# Patient Record
Sex: MALE | Race: OTHER | HISPANIC OR LATINO | URBAN - METROPOLITAN AREA
[De-identification: names, ages, dates, MRNs, and addresses within clinical notes are randomized per-mention and may not be internally consistent; named-entity substitution may affect disease eponyms.]

---

## 2023-05-09 ENCOUNTER — EMERGENCY (EMERGENCY)
Facility: HOSPITAL | Age: 38
LOS: 0 days | Discharge: HOME | End: 2023-05-09
Attending: EMERGENCY MEDICINE
Payer: SELF-PAY

## 2023-05-09 VITALS
DIASTOLIC BLOOD PRESSURE: 78 MMHG | HEART RATE: 60 BPM | SYSTOLIC BLOOD PRESSURE: 126 MMHG | HEIGHT: 65 IN | TEMPERATURE: 98 F | RESPIRATION RATE: 18 BRPM | OXYGEN SATURATION: 97 % | WEIGHT: 164.91 LBS
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
## 2023-05-09 DIAGNOSIS — S02.2XXA FRACTURE OF NASAL BONES, INITIAL ENCOUNTER FOR CLOSED FRACTURE: ICD-10-CM

## 2023-05-09 DIAGNOSIS — R22.0 LOCALIZED SWELLING, MASS AND LUMP, HEAD: ICD-10-CM
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
## 2023-05-09 DIAGNOSIS — R04.0 EPISTAXIS: ICD-10-CM

## 2023-05-09 PROCEDURE — 70486 CT MAXILLOFACIAL W/O DYE: CPT | Mod: MA

## 2023-05-09 PROCEDURE — 70450 CT HEAD/BRAIN W/O DYE: CPT | Mod: MA

## 2023-05-09 PROCEDURE — 70450 CT HEAD/BRAIN W/O DYE: CPT | Mod: 26,MA

## 2023-05-09 PROCEDURE — 70486 CT MAXILLOFACIAL W/O DYE: CPT | Mod: 26,MA

## 2023-05-09 PROCEDURE — 21315 CLSD TX NSL FX MNPJ WO STBLJ: CPT

## 2023-05-09 PROCEDURE — 99284 EMERGENCY DEPT VISIT MOD MDM: CPT

## 2023-05-09 PROCEDURE — 99285 EMERGENCY DEPT VISIT HI MDM: CPT | Mod: 25

## 2023-05-09 RX ORDER — IBUPROFEN 200 MG
600 TABLET ORAL ONCE
Refills: 0 | Status: COMPLETED | OUTPATIENT
Start: 2023-05-09 | End: 2023-05-09

## 2023-05-09 RX ADMIN — Medication 600 MILLIGRAM(S): at 22:09

## 2023-05-09 NOTE — ED PROVIDER NOTE - NSFOLLOWUPINSTRUCTIONS_ED_ALL_ED_FT
.Nasal Fracture    A nasal fracture is a break or crack in the bones or cartilage of the nose. Minor breaks do not require treatment. These breaks usually heal on their own after about one month. Serious breaks may require surgery.     CAUSES  This injury is usually caused by a blunt injury to the nose. This type of injury often occurs from:    Contact sports.  Car accidents.  Falls.  Getting punched.    SYMPTOMS  Symptoms of this injury include:    Pain.  Swelling of the nose.  Bleeding from the nose.  Bruising around the nose or eyes. This may include having black eyes.  Crooked appearance of the nose.    DIAGNOSIS  This injury may be diagnosed with a physical exam. The health care provider will gently feel the nose for signs of broken bones. He or she will look inside the nostrils to make sure that there is not a blood-filled swelling on the dividing wall between the nostrils (septal hematoma). X-rays of the nose may not show a nasal fracture even when one is present. In some cases, X-rays or a CT scan may be done 1–5 days after the injury. Sometimes, the health care provider will want to wait until the swelling has gone down.    TREATMENT  Often, minor fractures that have caused no deformity do not require treatment. More serious fractures in which bones have moved out of position may require surgery, which will take place after the swelling is gone. Surgery will stabilize and align the fracture. In some cases, a health care provider may be able to reposition the bones without surgery. This may be done in the health care provider's office after medicine is given to numb the area (local anesthetic).    HOME CARE INSTRUCTIONS  If directed, apply ice to the injured area:  Put ice in a plastic bag.  Place a towel between your skin and the bag.  Leave the ice on for 20 minutes, 2–3 times per day.  Take over-the-counter and prescription medicines only as told by your health care provider.  If your nose starts to bleed, sit in an upright position while you squeeze the soft parts of your nose against the dividing wall between your nostrils (septum) for 10 minutes.  Try to avoid blowing your nose.  Return to your normal activities as told by your health care provider. Ask your health care provider what activities are safe for you.  Avoid contact sports for 3–4 weeks or as told by your health care provider.  Keep all follow-up visits as told by your health care provider. This is important.    SEEK MEDICAL CARE IF:  Your pain increases or becomes severe.  You continue to have nosebleeds.  The shape of your nose does not return to normal within 5 days.  You have pus draining out of your nose.    SEEK IMMEDIATE MEDICAL CARE IF:  You have bleeding from your nose that does not stop after you pinch your nostrils closed for 20 minutes and keep ice on your nose.  You have clear fluid draining out of your nose.  You notice a grape-like swelling on the septum. This swelling is a collection of blood (hematoma) that must be drained to help prevent infection.  You have difficulty moving your eyes.  You have repeated vomiting.    ADDITIONAL NOTES AND INSTRUCTIONS    Please follow up with your Primary MD in 24-48 hr.  Seek immediate medical care for any new/worsening signs or symptoms.

## 2023-05-09 NOTE — ED PROVIDER NOTE - CARE PROVIDER_API CALL
Armando Gavin)  Otolaryngology  [address], [address] [place] [address]  Phone: [phone]  Fax: [phone]  Follow Up Time:     Bee Luong)  Plastic Surgery  [address], [address]  [place] [address]  Phone: [phone]  Fax: [phone]  Follow Up Time:

## 2023-05-09 NOTE — ED PROVIDER NOTE - CARE PROVIDERS DIRECT ADDRESSES
[email or web address]

## 2023-05-09 NOTE — ED PROVIDER NOTE - NS ED ATTENDING STATEMENT MOD
This was a shared visit with the [facility]. I reviewed and verified the documentation and independently performed the documented:

## 2023-05-09 NOTE — ED PROVIDER NOTE - PATIENT PORTAL LINK FT
You can access the FollowMyHealth Patient Portal offered by [facility] by registering at the following website: [email or web address]. By joining [facility]’s FollowMyHealth portal, you will also be able to view your health information using other applications (apps) compatible with our system.

## 2023-05-09 NOTE — ED PROVIDER NOTE - ATTENDING APP SHARED VISIT CONTRIBUTION OF CARE
37-year-old man complaining of nasal pain and deformity with left-sided facial swelling after he was hit in the face during martial arts practice.  Some pain with left eye movement, but no visual disturbance, headache, nausea, vomiting, neck pain or other focal deficits.  Vital signs, exam as noted.  Nasal bone fracture was reduced at bedside.  Will image to assess for orbital fracture, reassess.

## 2023-05-09 NOTE — ED ADULT NURSE NOTE - OBJECTIVE STATEMENT
Pt. c/o headache and nose pain. Pt. states that during martial arts, someone fell on his face. Pt. denies LOC.

## 2023-05-09 NOTE — ED PROVIDER NOTE - OBJECTIVE STATEMENT
36 yo male no sig hx present c/o nose pain/deformity, nosebleed and left sided facial bone and swelling s/p a person fell on top of his face during martial art practice. pain to left side of face worsen with left eye movement. otherwise denies HA/LOC/nausea and vomiting. denies neck/back pain/ chest and abd pain.

## 2023-05-09 NOTE — ED ADULT NURSE NOTE - NSIMPLEMENTINTERV_GEN_ALL_ED
Implemented All Universal Safety Interventions:  [place] to call system. Call bell, personal items and telephone within reach. Instruct patient to call for assistance. Room bathroom lighting operational. Non-slip footwear when patient is off stretcher. Physically safe environment: no spills, clutter or unnecessary equipment. Stretcher in lowest position, wheels locked, appropriate side rails in place.

## 2023-05-09 NOTE — ED PROVIDER NOTE - CLINICAL SUMMARY MEDICAL DECISION MAKING FREE TEXT BOX
CT max face with nasal bone fracture only, no injury to the orbit.  Results were discussed with patient and he was discharged to follow-up PMD/ENT/plastics.

## 2023-05-09 NOTE — ED PROVIDER NOTE - PHYSICAL EXAMINATION
CONSTITUTIONAL: Well-appearing; well-nourished; in no apparent distress.   EYES: dried blood in left nostril. no septal hematoma.  nasal bone with significant deviation to the right.   CARDIOVASCULAR: Normal S1, S2; no murmurs, rubs, or gallops.   RESPIRATORY: Normal chest excursion with respiration; breath sounds clear and equal bilaterally; no wheezes, rhonchi, or rales.  MS: No evidence of trauma or deformity all extremities. no midline tenderness to neck/back.   SKIN: No significant ecchymosis or open wound to face.   NEURO/PSYCH: A & O x 4; grossly unremarkable. speaking coherently and moving all extremities. ambulate with normal and steady gait.